# Patient Record
Sex: MALE | Race: BLACK OR AFRICAN AMERICAN | Employment: UNEMPLOYED | URBAN - METROPOLITAN AREA
[De-identification: names, ages, dates, MRNs, and addresses within clinical notes are randomized per-mention and may not be internally consistent; named-entity substitution may affect disease eponyms.]

---

## 2017-02-03 LAB
CREATININE, EXTERNAL: 0.83
LDL-C, EXTERNAL: 73

## 2017-02-15 ENCOUNTER — OFFICE VISIT (OUTPATIENT)
Dept: ENDOCRINOLOGY | Age: 22
End: 2017-02-15

## 2017-02-15 VITALS
RESPIRATION RATE: 20 BRPM | HEART RATE: 83 BPM | OXYGEN SATURATION: 99 % | SYSTOLIC BLOOD PRESSURE: 130 MMHG | TEMPERATURE: 97.2 F | DIASTOLIC BLOOD PRESSURE: 66 MMHG | WEIGHT: 162 LBS | BODY MASS INDEX: 23.99 KG/M2 | HEIGHT: 69 IN

## 2017-02-15 DIAGNOSIS — R53.82 CHRONIC FATIGUE: ICD-10-CM

## 2017-02-15 DIAGNOSIS — D64.89 ANEMIA DUE TO OTHER CAUSE: ICD-10-CM

## 2017-02-15 DIAGNOSIS — R79.89 LOW TSH LEVEL: Primary | ICD-10-CM

## 2017-02-15 RX ORDER — RANITIDINE 150 MG/1
TABLET, FILM COATED ORAL
Refills: 11 | COMMUNITY
Start: 2017-02-02

## 2017-02-15 NOTE — PROGRESS NOTES
Wt Readings from Last 3 Encounters:   02/15/17 162 lb (73.5 kg)   10/12/16 156 lb (70.8 kg)   08/02/16 172 lb (78 kg)     Temp Readings from Last 3 Encounters:   02/15/17 97.2 °F (36.2 °C) (Oral)     BP Readings from Last 3 Encounters:   02/15/17 130/66   10/12/16 120/82   08/02/16 124/82     Pulse Readings from Last 3 Encounters:   02/15/17 83   10/12/16 84   08/02/16 72

## 2017-02-15 NOTE — PROGRESS NOTES
HISTORY OF PRESENT ILLNESS  Zaheer Bowen is a 24 y.o. male. HPI  Initial visit for Low TSH   accompanied by  Mother     Referred by [facility]   He Works at a gym       He reports of the same issue few years ago   His c/c is fatigue and sore muscles     He has some GI problems which he is seeing a GI , getting some enzyme supplements       He denies use of any protein powders  denies any illicit drug use     He has no compressive symptoms from goiter   He has no hyper or hypothyroid symptoms         Past Medical History   Diagnosis Date    Headache        Social History     Social History    Marital status: SINGLE     Spouse name: N/A    Number of children: N/A    Years of education: N/A     Occupational History    Not on file. Social History Main Topics    Smoking status: Never Smoker    Smokeless tobacco: Not on file    Alcohol use No    Drug use: Not on file    Sexual activity: Not on file     Other Topics Concern    Not on file     Social History Narrative       Family History   Problem Relation Age of Onset    Cancer Paternal Uncle                Review of Systems   Constitutional: Negative. HENT: Negative. Eyes: Negative for pain and redness. Respiratory: Negative. Cardiovascular: Negative for chest pain, palpitations and leg swelling. Gastrointestinal: Negative. Negative for constipation. Genitourinary: Negative. Musculoskeletal: Negative for myalgias. Skin: Negative. Neurological: Negative. Endo/Heme/Allergies: Negative. Psychiatric/Behavioral: Negative for depression and memory loss. The patient does not have insomnia. Physical Exam   Constitutional: He is oriented to person, place, and time. He appears well-developed and well-nourished. HENT:   Head: Normocephalic. Eyes: Conjunctivae and EOM are normal. Pupils are equal, round, and reactive to light. Neck: Normal range of motion. Neck supple. No JVD present. No tracheal deviation present.  No thyromegaly present. Cardiovascular: Normal rate, regular rhythm and normal heart sounds. Pulmonary/Chest: Effort normal and breath sounds normal.   Abdominal: Soft. Bowel sounds are normal.   Musculoskeletal: Normal range of motion. Lymphadenopathy:     He has no cervical adenopathy. Neurological: He is alert and oriented to person, place, and time. He has normal reflexes. Skin: Skin is warm. Psychiatric: He has a normal mood and affect. ASSESSMENT and PLAN    1. Low TSH   Low TSH  :  The differential includes, is normal for him and he is an outlier for lab range   Or early graves   Or thyroiditis  Or MNG, toxic  Reassured the patient   Order scan, thyrodi and thyroid usg     2. Abnormal weight loss :  He had GI evaluation   Gastritis - on rantac  He has some digestive symptoms   Super digestive enzymes - highest doses       3.  Fatigue/sore muscles  : will do CPK  Asked him to just start up on vit d and coq 10      > 50 % of time is spent on counseling

## 2017-02-15 NOTE — MR AVS SNAPSHOT
Visit Information Date & Time Provider Department Dept. Phone Encounter #  
 2/15/2017 11:00 AM Kat Ivory MD [facility] [phone] [id number] Follow-up Instructions Return if symptoms worsen or fail to improve. Upcoming Health Maintenance Date Due DTaP/Tdap/Td series (1 - Tdap) 12/11/2015 INFLUENZA AGE 9 TO ADULT 8/1/2016 Allergies as of 2/15/2017  Review Complete On: 2/15/2017 By: Kat Ivory MD  
 Not on File Current Immunizations  Never Reviewed No immunizations on file. Not reviewed this visit You Were Diagnosed With   
  
 Codes Comments Low TSH level    -  Primary ICD-10-CM: R94.6 ICD-9-CM: 794.5 Anemia due to other cause     ICD-10-CM: D64.89 Vitals BP Pulse Temp Resp Height(growth percentile) Weight(growth percentile) 130/66 83 97.2 °F (36.2 °C) (Oral) 20 5' 9\" (1.753 m) 162 lb (73.5 kg) SpO2 BMI Smoking Status 99% 23.92 kg/m2 Never Smoker BMI and BSA Data Body Mass Index Body Surface Area  
 23.92 kg/m 2 1.89 m 2 Preferred Pharmacy Pharmacy Name Phone [id number]- [place] [address] [phone] Your Updated Medication List  
  
   
This list is accurate as of: 2/15/17 12:32 PM.  Always use your most recent med list.  
  
  
  
  
 raNITIdine 150 mg tablet Commonly known as:  ZANTAC TAKE 1 TABLET TWICE A DAY We Performed the Following CBC WITH AUTOMATED DIFF [64987 CPT(R)] CK [id number] CPT(R)] THYROGLOBULIN AB [id number] CPT(R)] THYROID PEROXIDASE (TPO) AB [28135 CPT(R)] TSH 3RD GENERATION [80724 CPT(R)] Follow-up Instructions Return if symptoms worsen or fail to improve. To-Do List   
 02/15/2017 Imaging:  NM THYROID IMAGE UPT SNGL/MULTI   
  
 02/15/2017 Imaging:  US THYROID/PARATHYROID/SOFT TISS Patient Instructions Vit D3   1000  Int units daily Co q 10      100 one pill a day  ( for a short term ) Introducing \[facility] [place] [facility]\"" & HEALTH SERVICES! Dear Eliezer Needs: Thank you for requesting a [facility] account. Our records indicate that you already have an active [facility] account. You can access your account anytime at [email or web address] Did you know that you can access your hospital and ER discharge instructions at any time in [facility]? You can also review all of your test results from your hospital stay or ER visit. Additional Information If you have questions, please visit the Frequently Asked Questions section of the [facility] website at [email or web address]. Remember, [facility] is NOT to be used for urgent needs. For medical emergencies, dial 911. Now available from your iPhone and Android! Please provide this summary of care documentation to your next provider. Your primary care clinician is listed as Hedy Lemon. If you have any questions after today's visit, please call [phone].

## 2017-02-16 LAB
BASOPHILS # BLD AUTO: 0 X10E3/UL (ref 0–0.2)
BASOPHILS NFR BLD AUTO: 1 %
CK SERPL-CCNC: 273 U/L (ref 24–204)
EOSINOPHIL # BLD AUTO: 0.1 X10E3/UL (ref 0–0.4)
EOSINOPHIL NFR BLD AUTO: 1 %
ERYTHROCYTE [DISTWIDTH] IN BLOOD BY AUTOMATED COUNT: 13.7 % (ref 12.3–15.4)
HCT VFR BLD AUTO: 41.1 % (ref 37.5–51)
HGB BLD-MCNC: 13.3 G/DL (ref 12.6–17.7)
IMM GRANULOCYTES # BLD: 0 X10E3/UL (ref 0–0.1)
IMM GRANULOCYTES NFR BLD: 0 %
LYMPHOCYTES # BLD AUTO: 1.6 X10E3/UL (ref 0.7–3.1)
LYMPHOCYTES NFR BLD AUTO: 31 %
MCH RBC QN AUTO: 28.7 PG (ref 26.6–33)
MCHC RBC AUTO-ENTMCNC: 32.4 G/DL (ref 31.5–35.7)
MCV RBC AUTO: 89 FL (ref 79–97)
MONOCYTES # BLD AUTO: 0.5 X10E3/UL (ref 0.1–0.9)
MONOCYTES NFR BLD AUTO: 9 %
NEUTROPHILS # BLD AUTO: 3.1 X10E3/UL (ref 1.4–7)
NEUTROPHILS NFR BLD AUTO: 58 %
PLATELET # BLD AUTO: 263 X10E3/UL (ref 150–379)
RBC # BLD AUTO: 4.64 X10E6/UL (ref 4.14–5.8)
THYROGLOB AB SERPL-ACNC: <1 IU/ML (ref 0–0.9)
THYROPEROXIDASE AB SERPL-ACNC: 11 IU/ML (ref 0–34)
TSH SERPL DL<=0.005 MIU/L-ACNC: 0.15 UIU/ML (ref 0.45–4.5)
WBC # BLD AUTO: 5.2 X10E3/UL (ref 3.4–10.8)

## 2017-02-17 NOTE — PROGRESS NOTES
His muscle enzymes are slightly up explaining his soreness in muscles   His thryoid labs are low - so he definitely needs the tests I ordered   I did nto give f/u , but he needs it in 3 months with labs bnv   T2, cpk

## 2017-02-22 ENCOUNTER — OP HISTORICAL/CONVERTED ENCOUNTER (OUTPATIENT)
Dept: OTHER | Age: 22
End: 2017-02-22

## 2017-02-24 ENCOUNTER — TELEPHONE (OUTPATIENT)
Dept: ENDOCRINOLOGY | Age: 22
End: 2017-02-24

## 2017-02-24 DIAGNOSIS — R79.89 LOW TSH LEVEL: Primary | ICD-10-CM

## 2017-02-24 NOTE — TELEPHONE ENCOUNTER
Patient call returned he was informed of lab results he stated an understanding. Patient stated he has not scheduled his Uptake and Scan yet and will call back on Monday to schedule his follow up.

## 2017-02-24 NOTE — TELEPHONE ENCOUNTER
----- Message from Tj Luke sent at 2/23/2017  1:21 PM EST -----  Regarding: Dr Hoffman Para: [phone]  Pt is calling for his lab results

## 2017-03-01 NOTE — TELEPHONE ENCOUNTER
----- Message from Hallie Urrutia MD sent at 2/16/2017  8:25 PM EST -----  His muscle enzymes are slightly up explaining his soreness in muscles   His thryoid labs are low - so he definitely needs the tests I ordered   I did nto give f/u , but he needs it in 3 months with labs bnv   T2, cpk

## 2017-03-01 NOTE — PROGRESS NOTES
Informed pt of results. He states the USG and scan will cost him $600, and he will not be able to do it.  He also states he will call back to schedule the 3 month follow up

## 2019-11-27 ENCOUNTER — TELEPHONE (OUTPATIENT)
Dept: ENDOCRINOLOGY | Age: 24
End: 2019-11-27

## 2019-11-27 NOTE — TELEPHONE ENCOUNTER
----- Message from Liu Villarreal sent at 11/27/2019  1:02 PM EST -----  Regarding: Dr. Dale Frank Message/Vendor Calls    Caller's first and last name: Juan Ashia      Reason for call: test results      Callback required yes/no and why: yes      Best contact number(s): [phone]      Details to clarify the request: Patient was there in 2017 for hyperthyroidism and never got his test results.  Please call him with those results      Liu Villarreal

## 2019-11-27 NOTE — TELEPHONE ENCOUNTER
Informed pt again    Notes Recorded by David Hopper LPN on 4/7/8160 at 0:18 PM  Informed pt of results. He states the USG and scan will cost him $600, and he will not be able to do it.  He also states he will call back to schedule the 3 month follow up  ------    Notes Recorded by David Hopper LPN on 5/7/8884 at 7:45 PM  Left message  ------    Notes Recorded by Shira Garrett MD on 2/16/2017 at 8:25 PM  His muscle enzymes are slightly up explaining his soreness in muscles   His thryoid labs are low - so he definitely needs the tests I ordered   I did nto give f/u , but he needs it in 3 months with labs bnv   T2, cpk

## 2019-12-03 ENCOUNTER — OP HISTORICAL/CONVERTED ENCOUNTER (OUTPATIENT)
Dept: OTHER | Age: 24
End: 2019-12-03

## 2020-02-24 ENCOUNTER — OFFICE VISIT (OUTPATIENT)
Dept: ENDOCRINOLOGY | Age: 25
End: 2020-02-24

## 2020-02-24 VITALS
SYSTOLIC BLOOD PRESSURE: 116 MMHG | WEIGHT: 162.4 LBS | HEART RATE: 68 BPM | RESPIRATION RATE: 18 BRPM | OXYGEN SATURATION: 98 % | TEMPERATURE: 98 F | DIASTOLIC BLOOD PRESSURE: 42 MMHG | BODY MASS INDEX: 24.05 KG/M2 | HEIGHT: 69 IN

## 2020-02-24 DIAGNOSIS — R79.89 LOW TSH LEVEL: Primary | ICD-10-CM

## 2020-02-24 DIAGNOSIS — R53.83 FATIGUE, UNSPECIFIED TYPE: ICD-10-CM

## 2020-02-24 RX ORDER — OMEPRAZOLE 20 MG/1
CAPSULE, DELAYED RELEASE ORAL
COMMUNITY
Start: 2020-02-18

## 2020-02-24 NOTE — LETTER
2/24/20 Patient: Sarah Powell YOB: 1995 Date of Visit: 2/24/2020 David Garcia MD 
[address] [place] [address] [id number] [phone] VIA Facsimile: [phone] Dear David Garcia MD, Thank you for referring Mr. Sarah Powell to [address] [place] [address] for evaluation. My notes for this consultation are attached. If you have questions, please do not hesitate to call me. I look forward to following your patient along with you. Sincerely, Lexis Pedraza MD

## 2020-02-24 NOTE — PROGRESS NOTES
Room 1    Identified pt with two pt identifiers(name and ). Reviewed record in preparation for visit and have obtained necessary documentation. All patient medications has been reviewed. Chief Complaint   Patient presents with   Verlinda Smoker Establish Care    Labs     low tsh levels       Health Maintenance Due   Topic    DTaP/Tdap/Td series (1 - Tdap)    Influenza Age 5 to Adult        Vitals:    20 1551   BP: 116/42   Pulse: 68   Resp: 18   Temp: 98 °F (36.7 °C)   TempSrc: Oral   SpO2: 98%   Weight: 162 lb 6.4 oz (73.7 kg)   Height: 5' 9\" (1.753 m)   PainSc:   0 - No pain       Wt Readings from Last 3 Encounters:   20 162 lb 6.4 oz (73.7 kg)   02/15/17 162 lb (73.5 kg)   10/12/16 156 lb (70.8 kg)     Temp Readings from Last 3 Encounters:   20 98 °F (36.7 °C) (Oral)   02/15/17 97.2 °F (36.2 °C) (Oral)     BP Readings from Last 3 Encounters:   20 116/42   02/15/17 130/66   10/12/16 120/82     Pulse Readings from Last 3 Encounters:   20 68   02/15/17 83   10/12/16 84       No results found for: HBA1C, HGBE8, BHX7GJFJ, OKB2UNRI, YVA2EORV    Coordination of Care Questionnaire:   1) Have you been to an emergency room, urgent care, or hospitalized since your last visit?   no       2. Have seen or consulted any other health care provider since your last visit? NO    3) Do you have an Advanced Directive/ Living Will in place? NO  If yes, do we have a copy on file NO  If no, would you like information NO    Patient is accompanied by self I have received verbal consent from Dicie Kehr to discuss any/all medical information while they are present in the room.

## 2020-02-24 NOTE — PROGRESS NOTES
HISTORY OF PRESENT ILLNESS  Ranulfo Medeiros is a 21 y.o. male.   HPI     Referred for Low TSH by pcp  nano was previously  seen  in office in feb 2017  For same issue - low TSH   He has not followed up on tests , because of cost issues      Young patient presented with severe fatigue and sleepiness and GI problems  Again   He was found to have TSH 0.25 from    Jan 29 2020     He had complete eval of GI system by Dr. Maico Perez and no answers were found   Only abnormality was low TSH  He has significant family h/o thyroid disease   He is convinced that thyroid is the cause for his problems   He is a    He  Denies any substance use problems       He has no hyper or hypothyroid symptoms       Past Medical History:   Diagnosis Date    Headache        Social History     Socioeconomic History    Marital status: SINGLE     Spouse name: Not on file    Number of children: Not on file    Years of education: Not on file    Highest education level: Not on file   Occupational History    Not on file   Social Needs    Financial resource strain: Not on file    Food insecurity:     Worry: Not on file     Inability: Not on file    Transportation needs:     Medical: Not on file     Non-medical: Not on file   Tobacco Use    Smoking status: Never Smoker    Smokeless tobacco: Never Used   Substance and Sexual Activity    Alcohol use: No    Drug use: Not on file    Sexual activity: Not on file   Lifestyle    Physical activity:     Days per week: Not on file     Minutes per session: Not on file    Stress: Not on file   Relationships    Social connections:     Talks on phone: Not on file     Gets together: Not on file     Attends Congregational service: Not on file     Active member of club or organization: Not on file     Attends meetings of clubs or organizations: Not on file     Relationship status: Not on file    Intimate partner violence:     Fear of current or ex partner: Not on file     Emotionally abused: Not on file     Physically abused: Not on file     Forced sexual activity: Not on file   Other Topics Concern    Not on file   Social History Narrative    Not on file       Family History   Problem Relation Age of Onset    Cancer Paternal Uncle          Review of Systems   Constitutional: Positive for malaise/fatigue. HENT: Negative. Eyes: Negative for pain and redness. Respiratory: Negative. Cardiovascular: Negative for chest pain, palpitations and leg swelling. Gastrointestinal: Positive for abdominal pain and constipation. Genitourinary: Negative. Musculoskeletal: Negative for myalgias. Skin: Negative. Neurological: Negative. Endo/Heme/Allergies: Negative. Psychiatric/Behavioral: Negative for depression and memory loss. The patient does not have insomnia. Physical Exam  Constitutional:       Appearance: He is well-developed. HENT:      Head: Normocephalic. Eyes:      Conjunctiva/sclera: Conjunctivae normal.      Pupils: Pupils are equal, round, and reactive to light. Neck:      Musculoskeletal: Normal range of motion and neck supple. Cardiovascular:      Rate and Rhythm: Normal rate and regular rhythm. Pulmonary:      Effort: Pulmonary effort is normal.      Breath sounds: Normal breath sounds. Abdominal:      General: Bowel sounds are normal.      Palpations: Abdomen is soft. Musculoskeletal: Normal range of motion. Skin:     General: Skin is warm and dry. Neurological:      Mental Status: He is alert and oriented to person, place, and time. Deep Tendon Reflexes: Reflexes are normal and symmetric. Lab Results   Component Value Date/Time    TSH 0.146 (L) 02/15/2017 12:39 PM          ASSESSMENT and PLAN    1.   Low TSH :   His TSH was down to 0.1 in feb 2017 and but in   Jan 2020 - TSH is actually better   Reassured him that TSH is not  the cause for his symptoms  And he could be a simple outlier   Or there is slight fluctuation on TSH which is normal for him   Unless, the TSH goes below 0.1, I will not consider treatments  I will complete the eval by getting thyroid scan and usg       2.  Unspecified fatigue : for benefit of doubt, checking other hormone levels       > 50 % of time is spent on counseling at length to make him not worry about the thyrodi labs   Patient voiced understanding her plan of care

## 2020-03-04 ENCOUNTER — APPOINTMENT (OUTPATIENT)
Dept: ENDOCRINOLOGY | Age: 25
End: 2020-03-04

## 2020-03-04 DIAGNOSIS — R53.83 FATIGUE, UNSPECIFIED TYPE: ICD-10-CM

## 2020-03-04 DIAGNOSIS — R79.89 LOW TSH LEVEL: ICD-10-CM

## 2020-03-06 LAB
ACTH PLAS-MCNC: 29.4 PG/ML (ref 7.2–63.3)
CORTIS SERPL-MCNC: 9.6 UG/DL
FSH SERPL-ACNC: 1.5 MIU/ML (ref 1.5–12.4)
IGF-I SERPL-MCNC: 272 NG/ML (ref 115–355)
LH SERPL-ACNC: 4.8 MIU/ML (ref 1.7–8.6)
TESTOST FREE SERPL-MCNC: 13.3 PG/ML (ref 9.3–26.5)
TESTOST SERPL-MCNC: 447 NG/DL (ref 264–916)
THYROPEROXIDASE AB SERPL-ACNC: <6 IU/ML (ref 0–34)
TSH SERPL DL<=0.005 MIU/L-ACNC: 0.1 UIU/ML (ref 0.45–4.5)

## 2020-03-12 NOTE — PROGRESS NOTES
Inform this pt that his pituitary  gland is fine  But  His TSH thyroid values has come low as 0.1  He has to proceed with  the testing I ordered

## 2020-03-13 NOTE — PROGRESS NOTES
Patient informed. Patient states he will return the call to imaging center to have test scheduled. Contact info given to patient.

## 2023-05-25 RX ORDER — OMEPRAZOLE 20 MG/1
CAPSULE, DELAYED RELEASE ORAL
COMMUNITY
Start: 2020-02-18

## 2023-05-25 RX ORDER — RANITIDINE 150 MG/1
1 TABLET ORAL 2 TIMES DAILY
COMMUNITY
Start: 2017-02-02